# Patient Record
Sex: FEMALE | Race: WHITE | NOT HISPANIC OR LATINO | Employment: OTHER | URBAN - METROPOLITAN AREA
[De-identification: names, ages, dates, MRNs, and addresses within clinical notes are randomized per-mention and may not be internally consistent; named-entity substitution may affect disease eponyms.]

---

## 2019-05-09 ENCOUNTER — HOSPITAL ENCOUNTER (EMERGENCY)
Facility: HOSPITAL | Age: 64
Discharge: HOME | End: 2019-05-09
Attending: EMERGENCY MEDICINE | Admitting: EMERGENCY MEDICINE

## 2019-05-09 VITALS
DIASTOLIC BLOOD PRESSURE: 79 MMHG | TEMPERATURE: 97.7 F | WEIGHT: 224 LBS | OXYGEN SATURATION: 98 % | RESPIRATION RATE: 20 BRPM | HEART RATE: 79 BPM | SYSTOLIC BLOOD PRESSURE: 142 MMHG | HEIGHT: 61 IN | BODY MASS INDEX: 42.29 KG/M2
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
## 2019-05-09 PROCEDURE — 99282 EMERGENCY DEPT VISIT SF MDM: CPT

## 2019-05-09 PROCEDURE — 99283 EMERGENCY DEPT VISIT LOW MDM: CPT

## 2019-05-09 RX ORDER — DOXYCYCLINE HYCLATE 100 MG
100 TABLET ORAL 2 TIMES DAILY
Qty: 28 TABLET | Refills: 0 | Status: SHIPPED | OUTPATIENT
Start: 2019-05-09 | End: 2019-05-24

## 2023-04-18 ENCOUNTER — OFFICE VISIT (OUTPATIENT)
Dept: CARDIOLOGY | Facility: CLINIC | Age: 68
End: 2023-04-18
Payer: MEDICARE

## 2023-04-18 VITALS
HEIGHT: 61 IN | DIASTOLIC BLOOD PRESSURE: 74 MMHG | HEART RATE: 79 BPM | WEIGHT: 225 LBS | OXYGEN SATURATION: 98 % | BODY MASS INDEX: 42.48 KG/M2 | SYSTOLIC BLOOD PRESSURE: 120 MMHG

## 2023-04-18 DIAGNOSIS — I48.0 PAROXYSMAL ATRIAL FIBRILLATION: ICD-10-CM

## 2023-04-18 DIAGNOSIS — G47.33 OSA (OBSTRUCTIVE SLEEP APNEA): Primary | ICD-10-CM

## 2023-04-18 RX ORDER — TIOTROPIUM BROMIDE INHALATION SPRAY 3.12 UG/1
SPRAY, METERED RESPIRATORY (INHALATION)
COMMUNITY
Start: 2023-03-09

## 2023-04-18 RX ORDER — ESCITALOPRAM OXALATE 20 MG/1
20 TABLET ORAL DAILY
COMMUNITY
End: 2023-04-18 | Stop reason: HOSPADM

## 2023-04-18 RX ORDER — PREDNISONE 20 MG/1
TABLET ORAL
COMMUNITY
End: 2023-04-18 | Stop reason: HOSPADM

## 2023-04-18 RX ORDER — WARFARIN SODIUM 1 MG/1
TABLET ORAL
COMMUNITY
Start: 2023-03-22

## 2023-04-18 RX ORDER — ROSUVASTATIN CALCIUM 10 MG/1
TABLET, COATED ORAL
COMMUNITY
Start: 2023-04-17

## 2023-04-18 RX ORDER — WARFARIN SODIUM 3 MG/1
TABLET ORAL
COMMUNITY
Start: 2023-03-13 | End: 2023-04-18 | Stop reason: HOSPADM

## 2023-04-18 RX ORDER — LEVALBUTEROL TARTRATE 45 UG/1
AEROSOL, METERED ORAL
COMMUNITY
Start: 2023-03-10 | End: 2023-04-18 | Stop reason: HOSPADM

## 2023-04-18 RX ORDER — ALLOPURINOL 100 MG/1
TABLET ORAL
COMMUNITY
End: 2023-04-18 | Stop reason: HOSPADM

## 2023-04-18 RX ORDER — ERGOCALCIFEROL 1.25 MG/1
CAPSULE ORAL
COMMUNITY
End: 2023-04-18 | Stop reason: HOSPADM

## 2023-04-18 RX ORDER — COLCHICINE 0.6 MG/1
CAPSULE ORAL
COMMUNITY
End: 2023-04-18 | Stop reason: HOSPADM

## 2023-04-18 RX ORDER — ALBUTEROL SULFATE 90 UG/1
AEROSOL, METERED RESPIRATORY (INHALATION)
COMMUNITY

## 2023-04-18 NOTE — ASSESSMENT & PLAN NOTE
She reports that she continues to follow with Dr. Stevens cardiologist from [facility].  She reports that she is compliant with her Coumadin therapy and follows closely for her PT and INR's.    We discussed that untreated sleep apnea may potentiate atrial fibrillation so she is encouraged to continue her PAP therapy.

## 2023-04-18 NOTE — ASSESSMENT & PLAN NOTE
Baseline AHI 10 and last titration was May 7, 2014.  She is on CPAP therapy.  Download is reviewed with excellent control and excellent compliance.  She is benefiting from PAP therapy and we plan to continue PAP therapy.  Supply order to the DME of her choice.  Follow-up in 1 year or sooner for any complaints or concerns.

## 2023-04-18 NOTE — PROGRESS NOTES
Follow-Up Sleep Consult     Date:   2023  Name: Arabella Rasmussen  :   1955  PCP: Vishnu Gibbs MD    Chief Complaint   Patient presents with   • Sleep Apnea       Subjective     History of Present Illness  Arabella Rasmussen is a 67 y.o. female who presents today for follow-up on CAROLINA.  Routine scheduled follow-up on her known history of sleep apnea.  She reports that her airflow is comfortable.  She reports her mask is a good fit.  She reports her DME is helpful for supplies.    No specialty comments available.    History of CAROLINA with a baseline AHI of 10.  Her last titration study was May 7, 2014.       Current Treatment: Auto CPAP 10 to 16 cm on a DreamStation #2 a replacement per [employer].      Device Download  AHI on download is 0.7.  Compliance on download is 100%.  When used >4 hours is 100%.   Average use per night is 10 hours.    Current mask used is nasal mask.    The patient's relevant past medical, surgical, family, and social history reviewed and updated in Epic as appropriate.    Past Medical History:   Diagnosis Date   • A-fib    • COPD (chronic obstructive pulmonary disease)    • Gout    • Hypercholesterolemia    • Hyperlipidemia    • CAROLINA (obstructive sleep apnea)    • Shingles      Past Surgical History:   Procedure Laterality Date   • BACK SURGERY     • BELPHAROPTOSIS REPAIR     • EYE SURGERY     • FOOT SURGERY       OB History    No obstetric history on file.       Allergies   Allergen Reactions   • Penicillins Hives     Prior to Admission medications    Medication Sig Start Date End Date Taking? Authorizing Provider   albuterol sulfate  (90 Base) MCG/ACT inhaler Ventolin HFA 90 mcg/actuation aerosol inhaler   Yes ProviderMaral MD   budesonide (RINOCORT AQUA) 32 MCG/ACT nasal spray 2 sprays into the nostril(s) as directed by provider.   Yes ProviderMaral MD   metFORMIN (GLUCOPHAGE) 500 MG tablet  3/22/23  Yes ProviderMaral MD   nystatin (MYCOSTATIN) 100,000  "unit/mL suspension nystatin 100,000 unit/mL oral suspension   Yes Maral Zavala MD   rosuvastatin (CRESTOR) 10 MG tablet  4/17/23  Yes Maral Zavala MD   Spiriva Respimat 2.5 MCG/ACT aerosol solution inhaler  3/9/23  Yes Maral Zavala MD   warfarin (COUMADIN) 1 MG tablet  3/22/23  Yes Maral Zavala MD   allopurinol (ZYLOPRIM) 100 MG tablet allopurinol 100 mg tablet  4/18/23  Maral Zavala MD   apixaban (ELIQUIS) 2.5 MG tablet tablet Eliquis 2.5 mg tablet  Patient not taking: Reported on 4/18/2023 4/18/23  Maral Zavala MD   colchicine 0.6 MG capsule capsule Mitigare 0.6 mg capsule  Patient not taking: Reported on 4/18/2023 4/18/23  Maral Zavala MD   ergocalciferol (ERGOCALCIFEROL) 1.25 MG (31120 UT) capsule Vitamin D2 1,250 mcg (50,000 unit) capsule  Patient not taking: Reported on 4/18/2023 4/18/23  Maral Zavala MD   escitalopram (LEXAPRO) 20 MG tablet Take 1 tablet by mouth Daily.  Patient not taking: Reported on 4/18/2023 4/18/23  Maral Zavala MD   levalbuterol (XOPENEX HFA) 45 MCG/ACT inhaler  3/10/23 4/18/23  Maral Zavala MD   predniSONE (DELTASONE) 20 MG tablet prednisone 20 mg tablet  Patient not taking: Reported on 4/18/2023 4/18/23  Maral Zavala MD   SITagliptin (JANUVIA) 50 MG tablet Januvia 50 mg tablet  Patient not taking: Reported on 4/18/2023 4/18/23  Maral Zavala MD   warfarin (COUMADIN) 3 MG tablet  3/13/23 4/18/23  Maral Zavala MD     Family History   Problem Relation Age of Onset   • Alzheimer's disease Mother    • Cancer Mother    • Liver cancer Father    • Dementia Maternal Grandmother        Objective     Vital Signs:  /74   Pulse 79   Ht 154.9 cm (61\")   Wt 102 kg (225 lb)   SpO2 98%   BMI 42.51 kg/m²     Class 3 Severe Obesity (BMI >=40). Obesity-related health conditions include the following: obstructive sleep apnea. Obesity is improving with lifestyle " modifications. BMI is is above average; BMI management plan is completed. We discussed reduced meat intake. watching green leafy veggies. Drinking smoothies. Improved her diet over the last 2 years with about 16 pounds. .        Physical Exam  Constitutional:       Appearance: Normal appearance.   HENT:      Head: Normocephalic.   Pulmonary:      Effort: Pulmonary effort is normal.   Musculoskeletal:         General: Normal range of motion.      Cervical back: Neck supple.   Neurological:      Mental Status: She is alert and oriented to person, place, and time.   Psychiatric:         Mood and Affect: Mood normal.         Behavior: Behavior normal.         Thought Content: Thought content normal.         The following data was reviewed by: HEENA Rodriguez on 04/18/2023:    PAP download reviewed: CPAP downloaded dated March 19 through April 17, 2023.  I have reviewed and interpreted a 30-day download in the clinic today.         Assessment and Plan     Diagnoses and all orders for this visit:    1. CAROLINA (obstructive sleep apnea) (Primary)  Assessment & Plan:  Baseline AHI 10 and last titration was May 7, 2014.  She is on CPAP therapy.  Download is reviewed with excellent control and excellent compliance.  She is benefiting from PAP therapy and we plan to continue PAP therapy.  Supply order to the DME of her choice.  Follow-up in 1 year or sooner for any complaints or concerns.    Orders:  -     PAP Therapy    2. Paroxysmal atrial fibrillation  Assessment & Plan:  She reports that she continues to follow with Dr. Stevens cardiologist from [facility].  She reports that she is compliant with her Coumadin therapy and follows closely for her PT and INR's.    We discussed that untreated sleep apnea may potentiate atrial fibrillation so she is encouraged to continue her PAP therapy.        Report if any new/changing symptoms immediately         Follow Up  Return in about 1 year (around 4/18/2024) for  CAROLINA.  Patient was given instructions and counseling regarding her condition or for health maintenance advice. Please see specific information pulled into the AVS if appropriate.

## 2024-05-22 ENCOUNTER — OFFICE VISIT (OUTPATIENT)
Dept: CARDIOLOGY | Facility: CLINIC | Age: 69
End: 2024-05-22
Payer: MEDICARE

## 2024-05-22 VITALS
OXYGEN SATURATION: 95 % | HEART RATE: 86 BPM | BODY MASS INDEX: 41.54 KG/M2 | SYSTOLIC BLOOD PRESSURE: 110 MMHG | WEIGHT: 220 LBS | DIASTOLIC BLOOD PRESSURE: 70 MMHG | HEIGHT: 61 IN

## 2024-05-22 DIAGNOSIS — G47.33 OSA (OBSTRUCTIVE SLEEP APNEA): Primary | ICD-10-CM

## 2024-05-22 PROCEDURE — 99213 OFFICE O/P EST LOW 20 MIN: CPT | Performed by: NURSE PRACTITIONER

## 2024-05-22 PROCEDURE — 1160F RVW MEDS BY RX/DR IN RCRD: CPT | Performed by: NURSE PRACTITIONER

## 2024-05-22 PROCEDURE — 1159F MED LIST DOCD IN RCRD: CPT | Performed by: NURSE PRACTITIONER

## 2024-05-22 RX ORDER — LEVALBUTEROL TARTRATE 45 UG/1
AEROSOL, METERED ORAL
COMMUNITY
Start: 2023-12-30

## 2024-05-22 RX ORDER — COLCHICINE 0.6 MG/1
0.6 TABLET ORAL AS NEEDED
COMMUNITY

## 2024-05-22 RX ORDER — ALBUTEROL SULFATE 2.5 MG/3ML
2.5 SOLUTION RESPIRATORY (INHALATION)
COMMUNITY

## 2024-05-22 RX ORDER — BENZONATATE 100 MG/1
100 CAPSULE ORAL AS NEEDED
COMMUNITY
Start: 2024-05-15

## 2024-05-22 RX ORDER — ALLOPURINOL 100 MG/1
100 TABLET ORAL AS NEEDED
COMMUNITY

## 2024-05-22 NOTE — PROGRESS NOTES
Follow-Up Sleep Consult     Date:   2024  Name: Arabella Rasmussen  :   1955  PCP: Vishnu Gibbs MD    Chief Complaint   Patient presents with    Sleep Apnea       Subjective     History of Present Illness  Arabella Rasmussen is a 68 y.o. female who presents today for follow-up on CAROLINA.  Annual follow-up for known history of mild sleep apnea on CPAP therapy.    Sleep history:    CAROLINA baseline AHI 10.      Titration study was May 7, 2014.       Current Treatment: Auto CPAP 10 to 16 cm    Coexisting allergies, asthma and paroxysmal atrial fibrillation      Current mask used is nasal cushion    Device Functioning Well: Yes but noted on DreamStation #2.  Discussed device has a warning of overheating.  Patient verbalized understanding.  Mask Fit Comfortable: Yes  Air Flow Comfortable: Yes  DME Helpful for Supplies: Yes  Sleep is rested: Yes        Device Download:                 The patient's relevant past medical, surgical, family, and social history reviewed and updated in Epic as appropriate.    Past Medical History:   Diagnosis Date    A-fib     COPD (chronic obstructive pulmonary disease)     Gout     Hypercholesterolemia     Hyperlipidemia     CAROLINA (obstructive sleep apnea)     Shingles      Past Surgical History:   Procedure Laterality Date    BACK SURGERY      BELPHAROPTOSIS REPAIR      EYE SURGERY      FOOT SURGERY       OB History    No obstetric history on file.       Allergies   Allergen Reactions    Penicillins Hives     Prior to Admission medications    Medication Sig Start Date End Date Taking? Authorizing Provider   albuterol (PROVENTIL) (2.5 MG/3ML) 0.083% nebulizer solution Inhale 2.5 mg.   Yes Maral Zavala MD   allopurinol (ZYLOPRIM) 100 MG tablet Take 1 tablet by mouth As Needed.   Yes Maral Zavala MD   benzonatate (TESSALON) 100 MG capsule Take 1 capsule by mouth As Needed. 5/15/24  Yes Maral Zavala MD   budesonide (RINOCORT AQUA) 32 MCG/ACT nasal spray 2 sprays into the  "nostril(s) as directed by provider.   Yes Maral Zavala MD   Cholecalciferol 125 MCG (5000 UT) tablet Take 1 tablet by mouth Daily.   Yes aMral Zavala MD   colchicine 0.6 MG tablet Take 1 tablet by mouth As Needed.   Yes Maral Zavala MD   levalbuterol (XOPENEX HFA) 45 MCG/ACT inhaler [employer]:Via Inhaler 12/30/23  Yes Maral Zavala MD   metFORMIN (GLUCOPHAGE) 500 MG tablet  3/22/23  Yes Maral Zavala MD   rosuvastatin (CRESTOR) 10 MG tablet  4/17/23  Yes Maral Zavala MD   Spiriva Respimat 2.5 MCG/ACT aerosol solution inhaler  3/9/23  Yes Maral Zavala MD   warfarin (COUMADIN) 1 MG tablet  3/22/23  Yes Maral Zavala MD   albuterol sulfate  (90 Base) MCG/ACT inhaler Ventolin HFA 90 mcg/actuation aerosol inhaler  5/22/24  Maral Zavala MD   nystatin (MYCOSTATIN) 100,000 unit/mL suspension nystatin 100,000 unit/mL oral suspension  5/22/24  Maral Zavala MD     Family History   Problem Relation Age of Onset    Alzheimer's disease Mother     Cancer Mother     Liver cancer Father     Dementia Maternal Grandmother        Objective     Vital Signs:  /70 (BP Location: Left arm, Patient Position: Sitting)   Pulse 86   Ht 154.9 cm (61\")   Wt 99.8 kg (220 lb)   SpO2 95%   BMI 41.57 kg/m²     Class 3 Severe Obesity (BMI >=40). Obesity-related health conditions include the following: obstructive sleep apnea, hypertension, and dyslipidemias. Obesity is improving with lifestyle modifications. BMI is is above average; BMI management plan is completed. We discussed portion control.        Physical Exam  Constitutional:       Appearance: She is obese.   HENT:      Head: Normocephalic.      Nose: Nose normal.      Mouth/Throat:      Mouth: Mucous membranes are moist.   Pulmonary:      Effort: Pulmonary effort is normal.   Musculoskeletal:      Cervical back: Neck supple.      Right lower leg: No edema (trace).      Left lower leg: Left " lower leg edema: trace.   Skin:     General: Skin is warm and dry.   Neurological:      Mental Status: She is alert and oriented to person, place, and time.      Gait: Abnormal gait: ambulating with cane.   Psychiatric:         Mood and Affect: Mood normal.         Behavior: Behavior normal.         Thought Content: Thought content normal.         The following data was reviewed by: HEENA Rodriguez on 05/22/2024:    PAP download reviewed: 30-day download as above.  I have reviewed and interpreted the data on the download at today's visit.         Assessment and Plan     Diagnoses and all orders for this visit:    1. CAROLINA (obstructive sleep apnea) (Primary)  Assessment & Plan:  Baseline AHI is 10.  This is mild sleep apnea.    She is on CPAP therapy.  Download reviewed with good control and good compliance.    She is benefiting from PAP therapy and we plan to continue PAP therapy.    Prescription to DME of patient's choice for PAP supplies.    Follow-up for CAROLINA in about 6 to 7 months as her PAP device is nearing 5 years of age.  We will consider need for replacement at follow-up visit.    Orders:  -     PAP Therapy        Report if any new/changing symptoms immediately and discussed warning of overheating on DreamStation #2 .  Patient verbalized understanding.  Patient is advised to follow all safety precautions from [facility] and in addition should unplug the device when not in use.  Patient verbalized understanding.         Follow Up  Return in about 7 months (around 12/22/2024) for CAROLINA/ consider new cpap .  Patient was given instructions and counseling regarding her condition or for health maintenance advice. Please see specific information pulled into the AVS if appropriate.

## 2024-05-22 NOTE — ASSESSMENT & PLAN NOTE
Baseline AHI is 10.  This is mild sleep apnea.    She is on CPAP therapy.  Download reviewed with good control and good compliance.    She is benefiting from PAP therapy and we plan to continue PAP therapy.    Prescription to DME of patient's choice for PAP supplies.    Follow-up for CAROLINA in about 6 to 7 months as her PAP device is nearing 5 years of age.  We will consider need for replacement at follow-up visit.

## 2024-09-17 ENCOUNTER — TELEPHONE (OUTPATIENT)
Dept: CARDIOLOGY | Facility: CLINIC | Age: 69
End: 2024-09-17

## 2024-09-17 NOTE — TELEPHONE ENCOUNTER
Caller: Arabella Rasmussen    Relationship: Self    Best call back number: [phone]     What is the best time to reach you: ANYTIME    What was the call regarding: LAST TIME SHE WAS IN OFFICE SHE WAS TOLD ABOUT RETURNING CPAP THAT WAS RECALLED/ REPLACED. PT MISPLACED THIS, AND FOUND IT 10 MINS AGO. WONDERING WHERE SHE SENDS THIS TOO, PLEASE ADVISE. PT DOES NOT HAVE PAPERWORK ANY LONGER.     Is it okay if the provider responds through MyChart: CALL

## 2024-09-17 NOTE — TELEPHONE ENCOUNTER
Can you help with this? I was trying to look into your note but I couldn't find anything about returning pap machine. Please advise. Thanks.

## 2024-09-17 NOTE — TELEPHONE ENCOUNTER
This patient did have a recalled PAP machine but she has already received a replacement device of a DreamStation #2.  I did give her a copy of her supply order from her DME.  Noted that her DreamStation #2 has an FDA warning of overheating and I did give her a follow-up appointment as her original device is nearing 5 years of age and her insurance may replace it.  I believe that is why she has a December follow-up visit coming back.  Hope this helps.

## 2024-12-17 ENCOUNTER — PATIENT ROUNDING (BHMG ONLY) (OUTPATIENT)
Dept: CARDIOLOGY | Facility: CLINIC | Age: 69
End: 2024-12-17
Payer: MEDICARE

## 2024-12-17 ENCOUNTER — OFFICE VISIT (OUTPATIENT)
Dept: CARDIOLOGY | Facility: CLINIC | Age: 69
End: 2024-12-17
Payer: MEDICARE

## 2024-12-17 VITALS
HEIGHT: 61 IN | BODY MASS INDEX: 39.84 KG/M2 | WEIGHT: 211 LBS | OXYGEN SATURATION: 97 % | SYSTOLIC BLOOD PRESSURE: 120 MMHG | HEART RATE: 73 BPM | DIASTOLIC BLOOD PRESSURE: 70 MMHG

## 2024-12-17 DIAGNOSIS — G47.33 OSA (OBSTRUCTIVE SLEEP APNEA): Primary | ICD-10-CM

## 2024-12-17 PROCEDURE — 1159F MED LIST DOCD IN RCRD: CPT | Performed by: NURSE PRACTITIONER

## 2024-12-17 PROCEDURE — 1160F RVW MEDS BY RX/DR IN RCRD: CPT | Performed by: NURSE PRACTITIONER

## 2024-12-17 PROCEDURE — 99213 OFFICE O/P EST LOW 20 MIN: CPT | Performed by: NURSE PRACTITIONER

## 2024-12-17 RX ORDER — WARFARIN SODIUM 3 MG/1
TABLET ORAL
COMMUNITY
Start: 2024-12-09

## 2024-12-17 NOTE — PROGRESS NOTES
..My name is Jaclyn Marc and I am the Practice Manager for [facility] [place].    I would like to thank you for being a loyal patient. If you do not mind I would like to ask you a few questions about your recent visit with us.  Please feel free to reply if you wish to provide us with feedback on your first visit with our practice.    First, could you tell me what went well with your recent visit?    Secondly, we are always looking for ways to make our patients' experiences even better.  Do you have any recommendations on ways we may improve?    Finally, overall were you satisfied with your first visit to us as a [facility] [place] facility?    In the next few days, you will be receiving a Patient Experience Survey.      Thank you for taking the time to answer a few questions today.  I hope you have a good day.

## 2024-12-17 NOTE — PROGRESS NOTES
Follow-Up Sleep Consult     Date:   2024  Name: Arabella Rasmussen  :   1955  PCP: Vishnu Gibbs MD    Chief Complaint   Patient presents with    Sleep Apnea       Subjective     History of Present Illness  Arabella Rasmussen is a 69 y.o. female who presents today for follow-up on CAROLINA.  Today is a scheduled 6-month follow-up for her known history of mild sleep apnea on CPAP therapy.    Sleep history:    CAROLINA baseline AHI 10.      Titration study was May 7, 2014.       Current Treatment: Auto CPAP 10 to 16 cm    Coexisting allergies, asthma and paroxysmal atrial fibrillation      Current mask used is nasal cushion    Device Functioning Well: No, Device is greater than 5 years old  and current device is a DreamStation #2 that has an FDA warning of overheating.  Patient wishes to replace the device.  Mask Fit Comfortable: Yes  Air Flow Comfortable: Yes  DME Helpful for Supplies: Yes  Sleep is rested: Yes    Device Download:                 The patient's relevant past medical, surgical, family, and social history reviewed and updated in Epic as appropriate.    Past Medical History:   Diagnosis Date    A-fib     COPD (chronic obstructive pulmonary disease)     Gout     Hypercholesterolemia     Hyperlipidemia     CAROLINA (obstructive sleep apnea)     Shingles      Past Surgical History:   Procedure Laterality Date    BACK SURGERY      BELPHAROPTOSIS REPAIR      EYE SURGERY      FOOT SURGERY       OB History    No obstetric history on file.       Allergies   Allergen Reactions    Penicillins Hives     Prior to Admission medications    Medication Sig Start Date End Date Taking? Authorizing Provider   albuterol (PROVENTIL) (2.5 MG/3ML) 0.083% nebulizer solution Inhale 2.5 mg.   Yes Maral Zavala MD   allopurinol (ZYLOPRIM) 100 MG tablet Take 1 tablet by mouth As Needed.   Yes Maral Zavala MD   benzonatate (TESSALON) 100 MG capsule Take 1 capsule by mouth As Needed. 5/15/24  Yes Maral Zavala MD  "  budesonide (RINOCORT AQUA) 32 MCG/ACT nasal spray Administer 2 sprays into the nostril(s) as directed by provider.   Yes Provider, MD Maral   Cholecalciferol 125 MCG (5000 UT) tablet Take 1 tablet by mouth Daily.   Yes Provider, MD Maral   colchicine 0.6 MG tablet Take 1 tablet by mouth As Needed.   Yes ProviderMaral MD   levalbuterol (XOPENEX HFA) 45 MCG/ACT inhaler [employer]:Via Inhaler 12/30/23  Yes Provider, MD Maral   metFORMIN (GLUCOPHAGE) 500 MG tablet  3/22/23  Yes Provider, MD Maral   rosuvastatin (CRESTOR) 10 MG tablet  4/17/23  Yes ProviderMaral MD   Spiriva Respimat 2.5 MCG/ACT aerosol solution inhaler  3/9/23  Yes Provider, MD Maral   warfarin (COUMADIN) 3 MG tablet  12/9/24  Yes Provider, MD Maral   warfarin (COUMADIN) 1 MG tablet  3/22/23 12/17/24  Provider, MD Maral     Family History   Problem Relation Age of Onset    Alzheimer's disease Mother     Cancer Mother     Liver cancer Father     Dementia Maternal Grandmother        Objective     Vital Signs:  /70 (BP Location: Left arm, Patient Position: Sitting, Cuff Size: Large Adult)   Pulse 73   Ht 154.9 cm (61\")   Wt 95.7 kg (211 lb)   SpO2 97%   BMI 39.87 kg/m²              Physical Exam  Constitutional:       Appearance: She is obese.   HENT:      Head: Normocephalic.      Nose: Nose normal.      Mouth/Throat:      Mouth: Mucous membranes are moist.   Pulmonary:      Effort: Pulmonary effort is normal.   Musculoskeletal:      Cervical back: Neck supple.   Skin:     General: Skin is warm and dry.   Neurological:      Mental Status: She is alert and oriented to person, place, and time.   Psychiatric:         Mood and Affect: Mood normal.         Behavior: Behavior normal.         Thought Content: Thought content normal.         The following data was reviewed by: HEENA Rodriguez on 12/17/2024:    PAP download reviewed: 30-day download as above.  I have reviewed and " interpreted the data on the download at today's visit         Assessment and Plan     Diagnoses and all orders for this visit:    1. CAROLINA (obstructive sleep apnea) (Primary)  Assessment & Plan:  She has a known history of mild sleep apnea.  Baseline AHI is 10.    She is on CPAP therapy.    Download reviewed with good control and good compliance.    She is benefiting from PAP therapy.    Plan to continue PAP therapy.    Noted that her original device is greater than 5 years old and her current device is a DreamStation #2 that has an FDA warning of overheating.  She wishes to replace this device.    Prescription to DME of patient's choice for new auto CPAP 10 to 16 cm and CPAP supplies.    Plan follow-up when she has had the device more than 1 month but less than 3 months.  We will plan to review a download for control and compliance.    Orders:  -     PAP Therapy        Report if any new/changing symptoms immediately         Follow Up  Return in about 3 months (around 3/12/2025) for CAROLINA/31-90 days new cpap .  Patient was given instructions and counseling regarding her condition or for health maintenance advice. Please see specific information pulled into the AVS if appropriate.

## 2024-12-18 NOTE — ASSESSMENT & PLAN NOTE
She has a known history of mild sleep apnea.  Baseline AHI is 10.    She is on CPAP therapy.    Download reviewed with good control and good compliance.    She is benefiting from PAP therapy.    Plan to continue PAP therapy.    Noted that her original device is greater than 5 years old and her current device is a DreamStation #2 that has an FDA warning of overheating.  She wishes to replace this device.    Prescription to DME of patient's choice for new auto CPAP 10 to 16 cm and CPAP supplies.    Plan follow-up when she has had the device more than 1 month but less than 3 months.  We will plan to review a download for control and compliance.

## 2025-03-11 ENCOUNTER — OFFICE VISIT (OUTPATIENT)
Dept: CARDIOLOGY | Facility: CLINIC | Age: 70
End: 2025-03-11
Payer: MEDICARE

## 2025-03-11 VITALS
HEIGHT: 61 IN | OXYGEN SATURATION: 96 % | BODY MASS INDEX: 38.93 KG/M2 | SYSTOLIC BLOOD PRESSURE: 126 MMHG | HEART RATE: 76 BPM | DIASTOLIC BLOOD PRESSURE: 70 MMHG | WEIGHT: 206.2 LBS

## 2025-03-11 DIAGNOSIS — G47.33 OSA (OBSTRUCTIVE SLEEP APNEA): Primary | ICD-10-CM

## 2025-03-11 PROCEDURE — 99213 OFFICE O/P EST LOW 20 MIN: CPT | Performed by: NURSE PRACTITIONER

## 2025-03-11 PROCEDURE — 1160F RVW MEDS BY RX/DR IN RCRD: CPT | Performed by: NURSE PRACTITIONER

## 2025-03-11 PROCEDURE — 1159F MED LIST DOCD IN RCRD: CPT | Performed by: NURSE PRACTITIONER

## 2025-03-11 RX ORDER — CYCLOBENZAPRINE HCL 5 MG
TABLET ORAL
COMMUNITY
Start: 2025-03-05

## 2025-03-11 RX ORDER — MULTIPLE VITAMINS W/ MINERALS TAB 9MG-400MCG
1 TAB ORAL DAILY
COMMUNITY

## 2025-03-11 RX ORDER — ALBUTEROL SULFATE 90 UG/1
INHALANT RESPIRATORY (INHALATION)
COMMUNITY
Start: 2025-02-28

## 2025-03-11 NOTE — ASSESSMENT & PLAN NOTE
Baseline AHI is 10.  This is mild sleep apnea.    She is on a new CPAP more than 1 month but less than 3 months.    CPAP download reviewed with good control and good compliance.    She is benefiting from PAP therapy.    We plan to continue PAP therapy.    She has a current prescription for her CPAP supplies at the DME of her choice.    Plan follow-up for mild sleep apnea on CPAP therapy in 9 months or sooner for any CAROLINA or PAP concerns.

## 2025-03-11 NOTE — PROGRESS NOTES
Follow-Up Sleep Consult     Date:   2025  Name: Arabella Rasmussen  :   1955  PCP: Vishnu Gibbs MD    Chief Complaint   Patient presents with    Sleep Apnea       Subjective     History of Present Illness  Arabella Rasmussen is a 69 y.o. female who presents today for follow-up on CAROLINA.    She comes in today on her new CPAP more than 1 month but less than 3 months.  She reports she is satisfied with the device.    Sleep history:    CAROLINA baseline AHI 10.      Titration study was May 7, 2014.       Current Treatment: Auto CPAP 10 to 16 cm    Coexisting allergies, asthma and paroxysmal atrial fibrillation      Current mask used is nasal mask     Device Functioning Well: Yes  Mask Fit Comfortable: Yes  Air Flow Comfortable: Yes  DME Helpful for Supplies: Yes  Sleep is rested: Yes    Device Download:                         The patient's relevant past medical, surgical, family, and social history reviewed and updated in Epic as appropriate.    Past Medical History:   Diagnosis Date    A-fib     COPD (chronic obstructive pulmonary disease)     Gout     Hypercholesterolemia     Hyperlipidemia     CAROLINA (obstructive sleep apnea)     Shingles      Past Surgical History:   Procedure Laterality Date    BACK SURGERY      BELPHAROPTOSIS REPAIR      EYE SURGERY      FOOT SURGERY       OB History    No obstetric history on file.       Allergies   Allergen Reactions    Penicillins Hives     Prior to Admission medications    Medication Sig Start Date End Date Taking? Authorizing Provider   albuterol (PROVENTIL) (2.5 MG/3ML) 0.083% nebulizer solution Inhale 2.5 mg.   Yes ProviderMaral MD   albuterol sulfate  (90 Base) MCG/ACT inhaler  25  Yes Maral Zavala MD   allopurinol (ZYLOPRIM) 100 MG tablet Take 1 tablet by mouth As Needed.   Yes ProviderMaral MD   benzonatate (TESSALON) 100 MG capsule Take 1 capsule by mouth As Needed. 5/15/24  Yes ProviderMaral MD   budesonide (RINOCORT AQUA) 32  "MCG/ACT nasal spray Administer 2 sprays into the nostril(s) as directed by provider.   Yes Maral Zavala MD   Cholecalciferol 125 MCG (5000 UT) tablet Take 1 tablet by mouth Daily.   Yes Maral Zavala MD   colchicine 0.6 MG tablet Take 1 tablet by mouth As Needed.   Yes Maral Zavala MD   cyclobenzaprine (FLEXERIL) 5 MG tablet  3/5/25  Yes Maral Zavala MD   metFORMIN (GLUCOPHAGE) 500 MG tablet  3/22/23  Yes Maral Zavala MD   multivitamin with minerals tablet tablet Take 1 tablet by mouth Daily.   Yes Maral Zavala MD   rosuvastatin (CRESTOR) 10 MG tablet  4/17/23  Yes Maral Zavala MD   warfarin (COUMADIN) 3 MG tablet  12/9/24  Yes Maral Zavala MD   levalbuterol (XOPENEX HFA) 45 MCG/ACT inhaler SMART[facility]:Via Inhaler 12/30/23 3/11/25  Maral Zavala MD   Spiriva Respimat 2.5 MCG/ACT aerosol solution inhaler  3/9/23 3/11/25  Maral Zavala MD     Family History   Problem Relation Age of Onset    Alzheimer's disease Mother     Cancer Mother     Liver cancer Father     Dementia Maternal Grandmother        Objective     Vital Signs:  /70 (BP Location: Right arm, Patient Position: Sitting, Cuff Size: Large Adult)   Pulse 76   Ht 154.9 cm (61\")   Wt 93.5 kg (206 lb 3.2 oz)   SpO2 96%   BMI 38.96 kg/m²              Physical Exam  HENT:      Head: Normocephalic.      Nose: Nose normal.      Mouth/Throat:      Mouth: Mucous membranes are moist.   Pulmonary:      Effort: Pulmonary effort is normal.   Skin:     General: Skin is warm and dry.   Neurological:      Mental Status: She is alert and oriented to person, place, and time.   Psychiatric:         Mood and Affect: Mood normal.         Behavior: Behavior normal.         Thought Content: Thought content normal.         The following data was reviewed by: HEENA Rodriguez on 03/11/2025:    PAP download reviewed: 30-day download as above.  I have reviewed and interpreted the " data on the download at today's visit         Assessment and Plan     Diagnoses and all orders for this visit:    1. CAROLINA (obstructive sleep apnea) (Primary)  Assessment & Plan:  Baseline AHI is 10.  This is mild sleep apnea.    She is on a new CPAP more than 1 month but less than 3 months.    CPAP download reviewed with good control and good compliance.    She is benefiting from PAP therapy.    We plan to continue PAP therapy.    She has a current prescription for her CPAP supplies at the DME of her choice.    Plan follow-up for mild sleep apnea on CPAP therapy in 9 months or sooner for any CAROLINA or PAP concerns.          Report if any new/changing symptoms immediately         Follow Up  Return in about 9 months (around 12/11/2025) for CAROLINA.  Patient was given instructions and counseling regarding her condition or for health maintenance advice. Please see specific information pulled into the AVS if appropriate.